# Patient Record
Sex: MALE | Race: WHITE
[De-identification: names, ages, dates, MRNs, and addresses within clinical notes are randomized per-mention and may not be internally consistent; named-entity substitution may affect disease eponyms.]

---

## 2021-04-26 ENCOUNTER — HOSPITAL ENCOUNTER (OUTPATIENT)
Dept: HOSPITAL 75 - RAD | Age: 61
End: 2021-04-26
Attending: UROLOGY
Payer: COMMERCIAL

## 2021-04-26 DIAGNOSIS — N20.2: Primary | ICD-10-CM

## 2021-04-26 PROCEDURE — 74018 RADEX ABDOMEN 1 VIEW: CPT

## 2021-04-26 NOTE — DIAGNOSTIC IMAGING REPORT
INDICATION: History of renal calculi.



COMPARISON: None.



FINDINGS: Single frontal supine radiographic view of the abdomen

was obtained. Multiple bulky extraosseous calcifications are seen

projecting over the inferior pole of the right kidney. Several

other smaller extraosseous calcifications are also seen

projecting over the superior pole of the right kidney and both

superior and inferior poles of the left kidney. Indwelling

bilateral common iliac stents are noted. No unexpected radiopaque

foreign bodies are seen. Small bowel loops are nondistended.

There is no large collection of free peritoneal air. Osseous

structures show no acute abnormalities.



IMPRESSION:

1. Findings suggestive of bilateral renal calculi, largest of

which are seen projecting over the inferior pole the right

kidney. Correlation with CT may be of benefit.

2. Nonobstructive small bowel gas pattern.



Dictated by: 



  Dictated on workstation # [id number]